# Patient Record
Sex: FEMALE | URBAN - METROPOLITAN AREA
[De-identification: names, ages, dates, MRNs, and addresses within clinical notes are randomized per-mention and may not be internally consistent; named-entity substitution may affect disease eponyms.]

---

## 2024-05-10 ENCOUNTER — TELEPHONE (OUTPATIENT)
Dept: OBSTETRICS AND GYNECOLOGY | Facility: CLINIC | Age: 24
End: 2024-05-10
Payer: COMMERCIAL

## 2024-05-13 ENCOUNTER — TELEPHONE (OUTPATIENT)
Dept: OBSTETRICS AND GYNECOLOGY | Facility: CLINIC | Age: 24
End: 2024-05-13
Payer: COMMERCIAL

## 2024-05-13 NOTE — TELEPHONE ENCOUNTER
----- Message from Mandie Penn sent at 5/10/2024  1:05 PM CDT -----  Type:  Sooner Apoointment Request    Caller is requesting a sooner appointment.  Caller declined first available appointment listed below.  Caller will not accept being placed on the waitlist and is requesting a message be sent to doctor.  Name of Caller: Pt  When is the first available appointment?  Symptoms: 7 weeks pregnant, confirmed in hospital, baby heart beat is slow  Would the patient rather a call back or a response via [facility]? Call  Best Call Back Number: [phone]  Additional Information: Pt needs an appt to be seen by provider soon as pt is 7wks and the baby heart beat is slow.  Pregnancy was confirmed in hospital on May 1, 2024.  Pt will need an .

## 2024-05-13 NOTE — TELEPHONE ENCOUNTER
Interpretor Trevon, Id # [id number].  Successfully spoke with pt using interpretor. Regarding ED referral. Pt set up an appt with me, to see Dr Hoover. Appt set 5/5/24 @ 10am. For pregnancy confirmation. RK 12/22/24, per ultrasound.